# Patient Record
Sex: MALE | Race: WHITE
[De-identification: names, ages, dates, MRNs, and addresses within clinical notes are randomized per-mention and may not be internally consistent; named-entity substitution may affect disease eponyms.]

---

## 2017-11-27 ENCOUNTER — HOSPITAL ENCOUNTER (OUTPATIENT)
Dept: HOSPITAL 61 - PCVCIMAG | Age: 42
Discharge: HOME | End: 2017-11-27
Attending: INTERNAL MEDICINE
Payer: COMMERCIAL

## 2017-11-27 DIAGNOSIS — E78.5: ICD-10-CM

## 2017-11-27 DIAGNOSIS — I10: Primary | ICD-10-CM

## 2017-11-27 DIAGNOSIS — R07.9: ICD-10-CM

## 2017-11-27 PROCEDURE — 93325 DOPPLER ECHO COLOR FLOW MAPG: CPT

## 2017-11-27 PROCEDURE — 93351 STRESS TTE COMPLETE: CPT

## 2017-11-27 NOTE — [facility]AG
--------------- APPROVED REPORT --------------





Exam:  Stress Echocardiogram

Indication: Hypertension, Hyperlipidemia, Chest pain

Patient Location: Echo lab

Stress Nurse: Chasity Valiente RN

Status: routine



Ht: 6 ft 2 in  

HR: 83 bpm      BP: 128/70 mmHg

Rhythm: NSR



Procedure

The patient underwent an Exercise Stress Test using the Chau 

Protocol. Blood pressure, heart rate, and EKG were monitored.

An Echocardiogram was performed by technician in four stages in quad 

fashion.  At peak stress, four selected images were obtained and 

placed side by side with resting images for comparison.



Stress Test Details

Stress Test:  Exercise stress testing was performed using a Chau 

protocol.

HR

Resting HR:            83 bpmMax Heart Rate (APMHR): 177 bpm 

Max HR Achieved:  164 bpmTarget HR (85% APMHR): 150 bpm

% of APMHR:         92

Recovery HR:            108 bpm

HR response to stress: Normal HR response to stress



BP

Resting BP:  128/70 mmHg

Max BP:       176/84 mmHg

Recovery BP:       148/70 mmHg



ECG

Resting ECG:  Sinus Rhythm

Recovery ECG: Sinus Rhythm



Clinical

Reason for Termination: Maximal effort

Exercise duration: 13 min 30 sec

Highest Stage Achieved: Stage 5: 5.0 mph at 18% grade. 

Exercise capacity: 17.50 METs

Overall Exercise Capacity for Age: Good



Pre-Stress Echo

The resting Echocardiogram showed normal left ventricular 

contractility with an estimated Ejection Fraction of about &gt;55%. 

Normal wall motion in all segments on baseline images.



Post-Stress Echo

The stress Echocardiogram showed normal left ventricular 

contractility with an estimated Ejection Fraction of about 65-70%. 

Normal augmentation of wall motion in all segments on post stress 

images.



Clinical

No clinical or ECG evidence for ischemia.



Conclusion

Clinical Response:  Non-ischemic

Exercise Capacity:  Superior

Stress ECG Response:  Non-ischemic

Stress Echo Images:  Ischemic

The left ventricle is normal in size and wall thickness in both the 

rest and stress images.



Other Information

Study Quality: Adequate



&lt;Conclusion&gt;

The left ventricle is normal in size and wall thickness in both the 

rest and stress images.

## 2019-12-13 ENCOUNTER — HOSPITAL ENCOUNTER (OUTPATIENT)
Dept: HOSPITAL 61 - PCVCIMAG | Age: 44
Discharge: HOME | End: 2019-12-13
Attending: INTERNAL MEDICINE
Payer: COMMERCIAL

## 2019-12-13 DIAGNOSIS — Z82.49: ICD-10-CM

## 2019-12-13 DIAGNOSIS — E78.5: ICD-10-CM

## 2019-12-13 DIAGNOSIS — I10: ICD-10-CM

## 2019-12-13 DIAGNOSIS — I08.1: Primary | ICD-10-CM

## 2019-12-13 PROCEDURE — 93351 STRESS TTE COMPLETE: CPT

## 2019-12-13 PROCEDURE — 93325 DOPPLER ECHO COLOR FLOW MAPG: CPT

## 2019-12-17 NOTE — [facility]
--------------- APPROVED REPORT --------------





Study performed:  12/13/2019 10:23:52



Exam:  Stress Echocardiogram

Indication: Dyspnea on exertion, htn, hlp, fam hx cad

Patient Location: Echo lab

Stress Nurse: Shira Gallagher RN

Status: routine



Ht: 6 ft 2 in  

HR: 85 bpm      BP: 144/84 mmHg

Rhythm: NSR



Procedure

The patient underwent an Exercise Stress Test using the Chau 

Protocol. Blood pressure, heart rate, and EKG were monitored.

An Echocardiogram was performed by technician in four stages in quad 

fashion.  At peak stress, four selected images were obtained and 

placed side by side with resting images for comparison.



Stress Test Details

Stress Test:  Exercise stress testing was performed using a Chau 

protocol.

HR

Resting HR:            85 bpmMax Heart Rate (APMHR): 174 bpm 

Max HR Achieved:  162 bpmTarget HR (85% APMHR): 147 bpm

% of APMHR:         93

Recovery HR:            110 bpm

HR response to stress: Normal HR response to stress



BP

Resting BP:  144/84 mmHg

Max BP:       186/76 mmHg

Recovery BP:       148/80 mmHg

BP response to stress: Normal blood pressure response to 

stress.

ECG

Resting ECG:  Sinus Rhythm

Stress ECG:     Sinus Rhythm

ST Change: Normal

Arrhythmia:    None

Recovery ECG: Sinus Rhythm

Recovery ST Change: Normal

Recovery Arrhythmia: None



Clinical

Reason for Termination: Maximal effort

Stress Symptoms: Dyspnea

Exercise duration: 11 min 41 sec

Highest Stage Achieved: Stage 4: 4.2 mph at 16% grade. 

Exercise capacity: 13.7 METs

Overall Exercise Capacity for Age: Good

Scale: Active

Angina Score: None



Pre-Stress Echo

The resting Echocardiogram showed normal left ventricular 

contractility with an estimated Ejection Fraction of about >55%. 

The resting echocardiogram demonstrated normal wall motion in all 

wall segments.  



Post-Stress Echo

The stress Echocardiogram showed normal left ventricular 

contractility with an estimated Ejection Fraction of about 65%. 

Compared to rest, there were no stress-induced wall motion 

abnormalities. 



Clinical

No clinical or ECG evidence for ischemia.



Conclusion

Clinical Response:  Non-ischemic

Exercise Capacity:  Average

Stress ECG Response:  Non-ischemic

Stress Echo Images:  Non-ischemic

The left ventricle is normal in size and wall thickness in both the 

rest and stress images.

Mild mitral regurgitation and mild tricuspid regurgitation with PAP 

of 38 mmHg. Normal aortic and pulmonic valves without 

stenosis.



Other Information

Study Quality: Adequate



<Conclusion>

The left ventricle is normal in size and wall thickness in both the 

rest and stress images.

Mild mitral regurgitation and mild tricuspid regurgitation with PAP 

of 38 mmHg. Normal aortic and pulmonic valves without stenosis.

## 2020-10-23 ENCOUNTER — HOSPITAL ENCOUNTER (OUTPATIENT)
Dept: HOSPITAL 35 - CAT | Age: 45
End: 2020-10-23
Attending: INTERNAL MEDICINE
Payer: COMMERCIAL

## 2020-10-23 DIAGNOSIS — I25.10: Primary | ICD-10-CM

## 2020-10-23 DIAGNOSIS — E78.00: ICD-10-CM

## 2020-10-23 DIAGNOSIS — Z13.6: ICD-10-CM

## 2020-11-10 ENCOUNTER — HOSPITAL ENCOUNTER (OUTPATIENT)
Dept: HOSPITAL 35 - LAB | Age: 45
End: 2020-11-10
Attending: FAMILY MEDICINE
Payer: COMMERCIAL

## 2020-11-10 DIAGNOSIS — Z20.828: Primary | ICD-10-CM

## 2021-08-19 ENCOUNTER — HOSPITAL ENCOUNTER (OUTPATIENT)
Dept: HOSPITAL 35 - NEURO | Age: 46
End: 2021-08-19
Attending: FAMILY MEDICINE
Payer: COMMERCIAL

## 2021-08-19 DIAGNOSIS — R05: Primary | ICD-10-CM

## 2021-10-28 ENCOUNTER — HOSPITAL ENCOUNTER (OUTPATIENT)
Dept: HOSPITAL 35 - SJCVCIMAG | Age: 46
End: 2021-10-28
Attending: INTERNAL MEDICINE
Payer: COMMERCIAL

## 2021-10-28 DIAGNOSIS — I10: ICD-10-CM

## 2021-10-28 DIAGNOSIS — I25.10: Primary | ICD-10-CM

## 2021-10-28 DIAGNOSIS — R55: ICD-10-CM
